# Patient Record
Sex: MALE | URBAN - METROPOLITAN AREA
[De-identification: names, ages, dates, MRNs, and addresses within clinical notes are randomized per-mention and may not be internally consistent; named-entity substitution may affect disease eponyms.]

---

## 2018-02-23 ENCOUNTER — APPOINTMENT (RX ONLY)
Dept: URBAN - METROPOLITAN AREA CLINIC 50 | Facility: CLINIC | Age: 48
Setting detail: DERMATOLOGY
End: 2018-02-23

## 2018-02-23 DIAGNOSIS — L259 CONTACT DERMATITIS AND OTHER ECZEMA, UNSPECIFIED CAUSE: ICD-10-CM

## 2018-02-23 PROBLEM — L23.9 ALLERGIC CONTACT DERMATITIS, UNSPECIFIED CAUSE: Status: ACTIVE | Noted: 2018-02-23

## 2018-02-23 PROCEDURE — ? PRESCRIPTION

## 2018-02-23 PROCEDURE — ? KOH PREP

## 2018-02-23 PROCEDURE — 87220 TISSUE EXAM FOR FUNGI: CPT

## 2018-02-23 PROCEDURE — ? SEPARATE AND IDENTIFIABLE DOCUMENTATION

## 2018-02-23 PROCEDURE — 96372 THER/PROPH/DIAG INJ SC/IM: CPT

## 2018-02-23 PROCEDURE — ? INTRAMUSCULAR KENALOG

## 2018-02-23 PROCEDURE — 99202 OFFICE O/P NEW SF 15 MIN: CPT | Mod: 25

## 2018-02-23 RX ORDER — HALOBETASOL PROPIONATE 0.5 MG/G
THIN LAYER CREAM TOPICAL BID
Qty: 1 | Refills: 1 | Status: ERX | COMMUNITY
Start: 2018-02-23

## 2018-02-23 RX ADMIN — HALOBETASOL PROPIONATE THIN LAYER: 0.5 CREAM TOPICAL at 00:00

## 2018-02-23 ASSESSMENT — LOCATION ZONE DERM
LOCATION ZONE: TOE
LOCATION ZONE: TRUNK

## 2018-02-23 ASSESSMENT — LOCATION DETAILED DESCRIPTION DERM
LOCATION DETAILED: RIGHT DORSAL GREAT TOE
LOCATION DETAILED: RIGHT INFERIOR LATERAL LOWER BACK

## 2018-02-23 ASSESSMENT — LOCATION SIMPLE DESCRIPTION DERM
LOCATION SIMPLE: RIGHT LOWER BACK
LOCATION SIMPLE: RIGHT GREAT TOE

## 2018-02-23 NOTE — PROCEDURE: INTRAMUSCULAR KENALOG
Total Volume (Ccs): 1
Kenalog Preparation: kenalog
Consent: The risks of atrophy were reviewed with the patient.
Concentration (Mg/Ml): 40.0
Detail Level: None

## 2021-08-12 ENCOUNTER — APPOINTMENT (RX ONLY)
Dept: URBAN - METROPOLITAN AREA CLINIC 125 | Facility: CLINIC | Age: 51
Setting detail: DERMATOLOGY
End: 2021-08-12

## 2021-08-12 DIAGNOSIS — L30.1 DYSHIDROSIS [POMPHOLYX]: ICD-10-CM

## 2021-08-12 DIAGNOSIS — D18.0 HEMANGIOMA: ICD-10-CM

## 2021-08-12 DIAGNOSIS — L60.1 ONYCHOLYSIS: ICD-10-CM | Status: RESOLVING

## 2021-08-12 PROBLEM — D18.01 HEMANGIOMA OF SKIN AND SUBCUTANEOUS TISSUE: Status: ACTIVE | Noted: 2021-08-12

## 2021-08-12 PROCEDURE — ? TREATMENT REGIMEN

## 2021-08-12 PROCEDURE — ? COUNSELING

## 2021-08-12 PROCEDURE — 99203 OFFICE O/P NEW LOW 30 MIN: CPT

## 2021-08-12 PROCEDURE — ? PRESCRIPTION

## 2021-08-12 RX ORDER — BETAMETHASONE DIPROPIONATE 0.5 MG/G
OINTMENT TOPICAL
Qty: 1 | Refills: 3 | Status: ERX | COMMUNITY
Start: 2021-08-12

## 2021-08-12 RX ADMIN — BETAMETHASONE DIPROPIONATE: 0.5 OINTMENT TOPICAL at 00:00

## 2021-08-12 ASSESSMENT — LOCATION ZONE DERM
LOCATION ZONE: FINGER
LOCATION ZONE: FACE
LOCATION ZONE: TOENAIL

## 2021-08-12 ASSESSMENT — LOCATION DETAILED DESCRIPTION DERM
LOCATION DETAILED: RIGHT MEDIAL MALAR CHEEK
LOCATION DETAILED: LEFT GREAT TOENAIL
LOCATION DETAILED: LEFT DISTAL PALMAR INDEX FINGER
LOCATION DETAILED: LEFT DISTAL PALMAR RING FINGER

## 2021-08-12 ASSESSMENT — LOCATION SIMPLE DESCRIPTION DERM
LOCATION SIMPLE: LEFT RING FINGER
LOCATION SIMPLE: LEFT INDEX FINGER
LOCATION SIMPLE: LEFT GREAT TOE
LOCATION SIMPLE: RIGHT CHEEK

## 2021-08-12 NOTE — HPI: NAIL DYSTROPHY
How Severe Is It?: moderate
Is This A New Presentation, Or A Follow-Up?: Nail Dystrophy
Additional History: Patient reports nail is beginning to grow out

## 2021-08-12 NOTE — PROCEDURE: TREATMENT REGIMEN
Initiate Treatment: Betamethasone 0.05% ointment aaa hand bid 2 weeks on, 1 week off, repeat prn
Detail Level: Zone
Otc Regimen: Daily moisturizer \\nRecommended glove in a bottle\\nAquaphor and occlude qhs

## 2021-10-05 ENCOUNTER — APPOINTMENT (RX ONLY)
Dept: URBAN - METROPOLITAN AREA CLINIC 125 | Facility: CLINIC | Age: 51
Setting detail: DERMATOLOGY
End: 2021-10-05

## 2021-10-05 DIAGNOSIS — L30.1 DYSHIDROSIS [POMPHOLYX]: ICD-10-CM | Status: IMPROVED

## 2021-10-05 PROCEDURE — ? COUNSELING

## 2021-10-05 PROCEDURE — 99213 OFFICE O/P EST LOW 20 MIN: CPT

## 2021-10-05 PROCEDURE — ? PRESCRIPTION MEDICATION MANAGEMENT

## 2021-10-05 PROCEDURE — ? ADDITIONAL NOTES

## 2021-10-05 ASSESSMENT — LOCATION DETAILED DESCRIPTION DERM
LOCATION DETAILED: LEFT DISTAL PALMAR INDEX FINGER
LOCATION DETAILED: LEFT DISTAL PALMAR RING FINGER

## 2021-10-05 ASSESSMENT — LOCATION SIMPLE DESCRIPTION DERM
LOCATION SIMPLE: LEFT RING FINGER
LOCATION SIMPLE: LEFT INDEX FINGER

## 2021-10-05 ASSESSMENT — LOCATION ZONE DERM: LOCATION ZONE: FINGER

## 2021-10-05 NOTE — PROCEDURE: PRESCRIPTION MEDICATION MANAGEMENT
Continue Regimen: Betamethasone 0.05% ointment AAA hand BID 2 weeks on, 1 week off, repeat PRN.
Plan: Discussed patch testing
Detail Level: Zone
Render In Strict Bullet Format?: No

## 2022-01-24 ENCOUNTER — APPOINTMENT (RX ONLY)
Dept: URBAN - METROPOLITAN AREA CLINIC 125 | Facility: CLINIC | Age: 52
Setting detail: DERMATOLOGY
End: 2022-01-24

## 2022-01-24 DIAGNOSIS — D485 NEOPLASM OF UNCERTAIN BEHAVIOR OF SKIN: ICD-10-CM

## 2022-01-24 DIAGNOSIS — L30.1 DYSHIDROSIS [POMPHOLYX]: ICD-10-CM | Status: IMPROVED

## 2022-01-24 PROBLEM — D48.5 NEOPLASM OF UNCERTAIN BEHAVIOR OF SKIN: Status: ACTIVE | Noted: 2022-01-24

## 2022-01-24 PROCEDURE — ? PRESCRIPTION MEDICATION MANAGEMENT

## 2022-01-24 PROCEDURE — ? BIOPSY BY SHAVE METHOD

## 2022-01-24 PROCEDURE — 11102 TANGNTL BX SKIN SINGLE LES: CPT

## 2022-01-24 PROCEDURE — 99213 OFFICE O/P EST LOW 20 MIN: CPT | Mod: 25

## 2022-01-24 PROCEDURE — ? COUNSELING

## 2022-01-24 ASSESSMENT — LOCATION DETAILED DESCRIPTION DERM
LOCATION DETAILED: LEFT DISTAL PALMAR RING FINGER
LOCATION DETAILED: LEFT DISTAL PALMAR INDEX FINGER
LOCATION DETAILED: RIGHT CENTRAL MALAR CHEEK

## 2022-01-24 ASSESSMENT — LOCATION SIMPLE DESCRIPTION DERM
LOCATION SIMPLE: LEFT RING FINGER
LOCATION SIMPLE: RIGHT CHEEK
LOCATION SIMPLE: LEFT INDEX FINGER

## 2022-01-24 ASSESSMENT — LOCATION ZONE DERM
LOCATION ZONE: FINGER
LOCATION ZONE: FACE

## 2022-01-24 NOTE — PROCEDURE: PRESCRIPTION MEDICATION MANAGEMENT
Continue Regimen: Betamethasone 0.05% ointment AAA hand BID 2 weeks on, 1 week off, repeat PRN.
Detail Level: Zone
Render In Strict Bullet Format?: No
Path Notes (To The Dermatopathologist): Please check margins.

## 2024-04-23 NOTE — PROCEDURE: ADDITIONAL NOTES
Additional Notes: Recommended increasing the amount of moisturizer cream 2-3 times a day
Render Risk Assessment In Note?: no
Detail Level: Simple
Heterosexual